# Patient Record
Sex: MALE | Race: WHITE | URBAN - METROPOLITAN AREA
[De-identification: names, ages, dates, MRNs, and addresses within clinical notes are randomized per-mention and may not be internally consistent; named-entity substitution may affect disease eponyms.]

---

## 2018-10-16 ENCOUNTER — OUTPATIENT (OUTPATIENT)
Dept: OUTPATIENT SERVICES | Facility: HOSPITAL | Age: 68
LOS: 1 days | Discharge: HOME | End: 2018-10-16

## 2018-10-16 VITALS
OXYGEN SATURATION: 97 % | DIASTOLIC BLOOD PRESSURE: 77 MMHG | TEMPERATURE: 97 F | SYSTOLIC BLOOD PRESSURE: 141 MMHG | WEIGHT: 192.9 LBS | HEIGHT: 67 IN | HEART RATE: 79 BPM | RESPIRATION RATE: 17 BRPM

## 2018-10-16 VITALS — SYSTOLIC BLOOD PRESSURE: 161 MMHG | DIASTOLIC BLOOD PRESSURE: 82 MMHG

## 2018-10-16 LAB — GLUCOSE BLDC GLUCOMTR-MCNC: 164 MG/DL — HIGH (ref 70–99)

## 2018-10-18 DIAGNOSIS — H26.8 OTHER SPECIFIED CATARACT: ICD-10-CM

## 2018-10-18 DIAGNOSIS — E11.9 TYPE 2 DIABETES MELLITUS WITHOUT COMPLICATIONS: ICD-10-CM

## 2018-10-18 PROBLEM — Z86.79 PERSONAL HISTORY OF OTHER DISEASES OF THE CIRCULATORY SYSTEM: Chronic | Status: ACTIVE | Noted: 2018-10-16

## 2018-10-23 ENCOUNTER — OUTPATIENT (OUTPATIENT)
Dept: OUTPATIENT SERVICES | Facility: HOSPITAL | Age: 68
LOS: 1 days | Discharge: HOME | End: 2018-10-23

## 2018-10-23 VITALS
DIASTOLIC BLOOD PRESSURE: 72 MMHG | OXYGEN SATURATION: 97 % | HEIGHT: 67 IN | TEMPERATURE: 99 F | SYSTOLIC BLOOD PRESSURE: 143 MMHG | HEART RATE: 77 BPM | WEIGHT: 192.02 LBS | RESPIRATION RATE: 18 BRPM

## 2018-10-23 VITALS
HEIGHT: 67 IN | HEART RATE: 77 BPM | OXYGEN SATURATION: 97 % | RESPIRATION RATE: 18 BRPM | DIASTOLIC BLOOD PRESSURE: 72 MMHG | SYSTOLIC BLOOD PRESSURE: 143 MMHG | TEMPERATURE: 99 F | WEIGHT: 192.02 LBS

## 2018-10-23 DIAGNOSIS — H26.9 UNSPECIFIED CATARACT: Chronic | ICD-10-CM

## 2018-10-23 LAB — GLUCOSE BLDC GLUCOMTR-MCNC: 148 MG/DL — HIGH (ref 70–99)

## 2018-10-23 RX ORDER — GLIMEPIRIDE 1 MG
2.5 TABLET ORAL
Qty: 0 | Refills: 0 | COMMUNITY

## 2018-10-23 RX ORDER — LISINOPRIL 2.5 MG/1
0 TABLET ORAL
Qty: 0 | Refills: 0 | COMMUNITY

## 2018-10-23 NOTE — PRE-ANESTHESIA EVALUATION ADULT - NSANTHADDINFOFT_GEN_ALL_CORE
Because pt is not NPO, procedure will be done without IV sedation but with monitored anesthesia care. Both pt and Dr. Leong agree.

## 2018-10-26 DIAGNOSIS — E78.00 PURE HYPERCHOLESTEROLEMIA, UNSPECIFIED: ICD-10-CM

## 2018-10-26 DIAGNOSIS — H26.9 UNSPECIFIED CATARACT: ICD-10-CM

## 2018-10-26 DIAGNOSIS — E11.9 TYPE 2 DIABETES MELLITUS WITHOUT COMPLICATIONS: ICD-10-CM

## 2019-02-25 NOTE — ASU PREOP CHECKLIST - ORDERS/MEDICATION ADMINISTRATION RECORD ON CHART
PHYSICAL THERAPY Daily Note    Referring Provider:  Dr. Solomon Lujan    Diagnosis:       ICD-10-CM ICD-9-CM    1. Status post total shoulder arthroplasty, left Z96.612 V43.61             Orders:  Evaluate and Treat    Date of Initial Evaluation: 10-24-18      Visit # 26    SUBJECTIVE:  Pt. Reports cont. Tension in L upper arm.  Pt. Reports only mild discomfort in L upper arm.      Current Pain:  Not reported today    OBJECTIVE:      ASSESSMENT: Pt. Tolerated PROM of L shoulder joint well.  Pt. Has mild discomfort in L upper arm with passive ext. Rotation.  Pt.  Progressed to 75% L shoulder abduction actively.  Pt. Has about 85% L shoulder flexion and external rotation.  Pt. More independent with functional diagonal pattern exercises.  Pt still has muscle tension distal L supraspinatus region.  Pt. Educated on donning heat to L upper arm but still reports not doing so and refuses moist heat at clinic.  Pt. Will have one more visit next week if no increase in pain or loss of ROM.      TREATMENT PROVIDED:  -Manual Therapy:  L shoulder PROM with stretch and end range  X 5 min., STM x 8 min. To L biceps, L shoulder joint mob x 2 min.      -Therapeutic Exercise: one on one 13 min, supervised x 12 min..: L shoulder AROM x 5 min., D1 flex x 2 min., D2 flex x 2 min., middle trap x 2 min., lower trap x 2 min., ext. Rot x 2 min., int. Rot x 2 min., scap retraction x 2min., biceps curls x 2 min, shoulder ext x 2min., shoulder hor. Add x 2min.      -Modalities: N/A    -Education on condition and posture, activity modification    PLAN:  Patient will benefit from physical therapy (2-3) x/week for (4-6) weeks including manual therapy, therapeutic exercise, functional activities, modalities, and patient education.    Thank you for this referral.    These services are reasonable and necessary for the conditions set forth above while under my care.   done

## 2021-07-26 PROBLEM — E78.00 PURE HYPERCHOLESTEROLEMIA, UNSPECIFIED: Chronic | Status: ACTIVE | Noted: 2018-10-23

## 2021-07-28 ENCOUNTER — NON-APPOINTMENT (OUTPATIENT)
Age: 71
End: 2021-07-28

## 2021-07-28 PROBLEM — Z00.00 ENCOUNTER FOR PREVENTIVE HEALTH EXAMINATION: Status: ACTIVE | Noted: 2021-07-28

## 2021-08-06 ENCOUNTER — RESULT REVIEW (OUTPATIENT)
Age: 71
End: 2021-08-06

## 2021-08-11 ENCOUNTER — APPOINTMENT (OUTPATIENT)
Dept: UROLOGY | Facility: CLINIC | Age: 71
End: 2021-08-11
Payer: MEDICARE

## 2021-08-11 VITALS — BODY MASS INDEX: 29.03 KG/M2 | WEIGHT: 185 LBS | HEIGHT: 67 IN

## 2021-08-11 DIAGNOSIS — R35.0 FREQUENCY OF MICTURITION: ICD-10-CM

## 2021-08-11 DIAGNOSIS — Z12.5 ENCOUNTER FOR SCREENING FOR MALIGNANT NEOPLASM OF PROSTATE: ICD-10-CM

## 2021-08-11 DIAGNOSIS — N52.01 ERECTILE DYSFUNCTION DUE TO ARTERIAL INSUFFICIENCY: ICD-10-CM

## 2021-08-11 DIAGNOSIS — N40.0 BENIGN PROSTATIC HYPERPLASIA WITHOUT LOWER URINARY TRACT SYMPMS: ICD-10-CM

## 2021-08-11 PROCEDURE — 99204 OFFICE O/P NEW MOD 45 MIN: CPT

## 2021-08-11 RX ORDER — SILDENAFIL 100 MG/1
100 TABLET, FILM COATED ORAL
Qty: 15 | Refills: 6 | Status: ACTIVE | COMMUNITY
Start: 2021-08-11 | End: 1900-01-01

## 2021-08-11 NOTE — HISTORY OF PRESENT ILLNESS
[FreeTextEntry1] : ТАТЬЯНА BLANCAS is a 71 year year old presenting for a General Urology Check Up. \par Patient has a past medical history of Diabetes, high cholesterol, hypertension. \par \par Patient referred after having MRI of lumbar spine for back pain which showed an enlarged prostate. \par \par Urination symptoms: Patient states that he has good urinary stream. Patient reports nocturia 2-4 x nightly. Denies dysuria and gross hematuria. Denies urinary incontinence. Currently not taking any medications to help urination. \par IPSS:26/ 35 severe urinary symptoms.\par \par Erections: Patient reports difficulty getting and maintaining erection. Never taken medication. Denies ever having a heart attack. Denies chest pain with exercise. Denies taking nitroglycerine medication. \par IIEF: 5- Severe ED\par \par Prostate Cancer Screening: Patient states he last got PSA 6 months ago. States that results were normal. Patient states he got blood work at [facility]. \par PSA June 2021- 1.63 ng/mL\par PSA 3 years ago 1.25\par PSA 4 years ago 2.00\par PSA 5 years ago 1.27\par \par Liquid Intake: 7-8 cups of coffee daily. \par Occupation:\par \par Old Records:\par Hemoglobin A1c 2021- 9.2 ng/mL \par Creatinine- 0.62\par \par \par Primary Care Doctor: Dr. Eldridge . \par

## 2021-08-11 NOTE — PHYSICAL EXAM
[Well Groomed] : well groomed [General Appearance - In No Acute Distress] : no acute distress [] : no respiratory distress [Normal Station and Gait] : the gait and station were normal for the patient's age [Skin Color & Pigmentation] : normal skin color and pigmentation [No Focal Deficits] : no focal deficits [Oriented To Time, Place, And Person] : oriented to person, place, and time

## 2021-08-11 NOTE — ASSESSMENT
[FreeTextEntry1] : ТАТЬЯНА BLANCAS is a 71 year year old presenting for a General Urology Check Up. \par Patient has a past medical history of Diabetes, high cholesterol, hypertension. \par \par Patient referred after having MRI of lumbar spine for back pain which showed an enlarged prostate. \par \par Urination symptoms: Patient states that he has good urinary stream. Patient reports nocturia 2-4 x nightly. Denies dysuria and gross hematuria. Denies urinary incontinence. Currently not taking any medications to help urination. \par IPSS:26/ 35 severe urinary symptoms.\par \par Erections: Patient reports difficulty getting and maintaining erection. Never taken medication. Denies ever having a heart attack. Denies chest pain with exercise. Denies taking nitroglycerine medication. \par IIEF: 5- Severe ED\par \par Prostate Cancer Screening: Patient states he last got PSA 6 months ago. States that results were normal. Patient states he got blood work at [facility]. \par PSA June 2021- 1.63 ng/mL\par PSA 3 years ago 1.25\par PSA 4 years ago 2.00\par PSA 5 years ago 1.27\par \par Liquid Intake: 7-8 cups of coffee daily. \par Occupation:\par \par Old Records:\par Hemoglobin A1c 2021- 9.2 ng/mL \par Creatinine- 0.62\par \par \par Primary Care Doctor: Dr. Eldridge .

## 2021-09-24 RX ORDER — TADALAFIL 20 MG/1
20 TABLET ORAL
Qty: 10 | Refills: 0 | Status: ACTIVE | COMMUNITY
Start: 2021-09-24 | End: 1900-01-01

## 2021-10-22 ENCOUNTER — APPOINTMENT (OUTPATIENT)
Dept: UROLOGY | Facility: CLINIC | Age: 71
End: 2021-10-22

## 2022-03-28 ENCOUNTER — APPOINTMENT (OUTPATIENT)
Dept: UROLOGY | Facility: CLINIC | Age: 72
End: 2022-03-28

## 2023-02-15 ENCOUNTER — APPOINTMENT (OUTPATIENT)
Dept: CARDIOLOGY | Facility: CLINIC | Age: 73
End: 2023-02-15
Payer: MEDICARE

## 2023-02-15 ENCOUNTER — RESULT CHARGE (OUTPATIENT)
Age: 73
End: 2023-02-15

## 2023-02-15 VITALS
HEIGHT: 67 IN | HEART RATE: 71 BPM | SYSTOLIC BLOOD PRESSURE: 126 MMHG | WEIGHT: 186 LBS | BODY MASS INDEX: 29.19 KG/M2 | DIASTOLIC BLOOD PRESSURE: 68 MMHG

## 2023-02-15 PROCEDURE — 99212 OFFICE O/P EST SF 10 MIN: CPT

## 2023-02-15 NOTE — HISTORY OF PRESENT ILLNESS
[FreeTextEntry1] : Mr traylor is a 72 year old male patient, with Hx of HTN, DM DL, ex heavy smoker, have been evaluated in [facility] office for coronary calcification found on CT chest routine. echo was done showed normal EF\par a CCTA was scheduled for further. evaluation and started on asa statin and BB. \par However patient make a regular follow up appointment in the office here. \par BP stable currently patient is stable\par manager at [facility] office called. the CCTA referral will be renewed and patient will be contacted when ready to schedule an appointment for the CCTA at [facility]. \par explained detail to the patient \par Will follow with patient at [facility] office. \par

## 2023-04-24 ENCOUNTER — RESULT CHARGE (OUTPATIENT)
Age: 73
End: 2023-04-24

## 2023-04-24 ENCOUNTER — APPOINTMENT (OUTPATIENT)
Dept: ORTHOPEDIC SURGERY | Facility: CLINIC | Age: 73
End: 2023-04-24
Payer: MEDICARE

## 2023-04-24 DIAGNOSIS — M85.89 OTHER SPECIFIED DISORDERS OF BONE DENSITY AND STRUCTURE, MULTIPLE SITES: ICD-10-CM

## 2023-04-24 DIAGNOSIS — M17.12 UNILATERAL PRIMARY OSTEOARTHRITIS, LEFT KNEE: ICD-10-CM

## 2023-04-24 DIAGNOSIS — M17.11 UNILATERAL PRIMARY OSTEOARTHRITIS, RIGHT KNEE: ICD-10-CM

## 2023-04-24 PROCEDURE — 73562 X-RAY EXAM OF KNEE 3: CPT | Mod: 50

## 2023-04-24 PROCEDURE — 99204 OFFICE O/P NEW MOD 45 MIN: CPT

## 2023-04-24 RX ORDER — LIDOCAINE 4 G/100G
4 PATCH TOPICAL
Qty: 30 | Refills: 5 | Status: ACTIVE | COMMUNITY
Start: 2023-04-24 | End: 1900-01-01

## 2023-04-24 RX ORDER — MELOXICAM 7.5 MG/1
7.5 TABLET ORAL
Qty: 30 | Refills: 1 | Status: ACTIVE | COMMUNITY
Start: 2023-04-24 | End: 1900-01-01

## 2023-04-24 RX ORDER — ACETAMINOPHEN 500 MG/1
500 TABLET ORAL 3 TIMES DAILY
Qty: 120 | Refills: 0 | Status: ACTIVE | COMMUNITY
Start: 2023-04-24 | End: 1900-01-01

## 2023-04-24 NOTE — DISCUSSION/SUMMARY
[de-identified] : This note was dictated using voice recognition software and it is possible that there may have been errors of dictation.  Care was taken to review these errors and to correct them but it is possible that some may have been missed.  It is rare, but possible,  that these errors may cause a change in the substance of the note.    If there are any concerns or questions, please do not hesitate to contact us at [phone] to clarify\par  I reviewed their  follow-up /  new consult form that includes their current medications, allergies list of medical problems and previous hospitalizations and surgical procedures about the problem that has brought them in.  It also lists a pain scale both at rest and activity numerical 0-10.  It was the onset of the problem and the things that make it worse and better.  It lists their treatment up to date and their imaging up to date.  If further includes their social history and review of systems\par \par \par \par Location:  \par \par Quality: dull \par \par  Severity: 5/10 \par \par  Timing: intermittent, worse at night and after activity \par \par  Context: with activity and increased use \par \par Modifying factors: physical modalities such as heating pads or ice or pain cream or orthotics have not been employed usefully.  A therapeutic trial of OTC NSAIDS such as Ibuprofren 600mg (3 OTC pills) TID or Naproxen 450mg (2 over the counter pills) BID  and Acetaminophen  1000mg  (2 extra strength 500mg tablets or 3 regular strength 325mg tablets) has not been fully executed but intermittent use of similar has given partial temporary relief \par \par Associated signs and symptoms/Pertinent negatives: They have no signs or symptoms of DVT or PE.  They have not had any chest pain or shortness of breath or calf swelling.  They have not had constitutional signs of weight loss or night sweats or easy bruisability.  They have not had any particular exposure to ticks or Lyme disease or presence of rashes\par \par OVERALL PHYSICAL EXAM\par GENERAL: Well developed well nourished in no acute distress \par \par MENTAL:Alert and oriented x3, normal affect, Pleasant and accompanied by family \par \par MUSCULOSKELETAL: Ambulating independently \par \par HEENT: NCAT, EOM intact, mucosa moist, neck supple with adequate free rom \par \par CARDIOVASCULAR/HEMATOLOGICAL: no JVD, no peripheral edema, good capillary refill,  skin warm and well perfused, no venous stasis ulcers, pulses intact, no pallor or superficial non-traumatic bruising \par \par NEUROLOGICAL: free passive rom without rigidity or cog wheel motion \par \par RESPIRATORY: no gross stridor or wheezing or increased respiratory effort or use of O2, good capil refill and color \par \par INTEGUMENTARY: skin intact without obvious suspicious lesions where examined \par \par \par Bilateral KNEE EXAM\par There range of motion is preserved.  They are slightly uncomfortable at the patellofemoral joint with compression and with resisted straight leg raising.  There is a very mild effusion and they do have joint line tenderness medial greater than lateral.  Anterior and posterior drawer are negative.  The knee is stable to varus valgus stress there neurovascular intact without signs or symptoms of DVT\par \par OSTEOPENIA\par We discussed with them the maintenance of bone health through weightbearing activities and general health measures such as smoking cessation, diabetic control and proper weight maintenance.  We did advocate that they take vitamin D 1 to 5000 units a day and calcium citrate 500 mg a day.  This can be obtained over-the-counter.  We stressed that they should take calcium citrate not calcium carbonate which is more like carbon/chalk and is not as well absorbed.  We encourage them to speak to their primary care physician as regards the issue of whether or not they should get a bone density test and possibly be on adjunct of treatment such as Prolia, Fosamax etc.\par \par TODAY IN OUR OFFICE WE HAD X-RAYS OF BOTH KNEES AP AND LATERAL AND THEY BOTH SHOW SEVERE DJD.  INTERESTINGLY REALLY ONLY THE RIGHT SIDE HURTS BUT THE LEFT SIDE IS EQUALLY ARTHRITIC.\par \par \par He really just wanted reassurance and an explanation and does not want an injection or any other treatment at this time and he will follow-up as needed.  He does one the Tylenol and meloxicam and calcium and vitamin D and we will call that in he does have a this note was dictated using voice recognition software and it is possible that there may have been errors of dictation.  Care was taken to review these errors and to correct them but it is possible that some may have been missed.  It is rare, but possible,  that these errors may cause a change in the substance of the note.    If there are any concerns or questions, please do not hesitate to contact us at [phone] to clarify\par  I reviewed their  follow-up /  new consult form that includes their current medications, allergies list of medical problems and previous hospitalizations and surgical procedures about the problem that has brought them in.  It also lists a pain scale both at rest and activity numerical 0-10.  It was the onset of the problem and the things that make it worse and better.  It lists their treatment up to date and their imaging up to date.  If further includes their social history and review of systems\par \par \par \par Location:  \par \par Quality: dull \par \par  Severity: 5/10 \par \par  Timing: intermittent, worse at night and after activity \par \par  Context: with activity and increased use \par \par Modifying factors: physical modalities such as heating pads or ice or pain cream or orthotics have not been employed usefully.  A therapeutic trial of OTC NSAIDS such as Ibuprofren 600mg (3 OTC pills) TID or Naproxen 450mg (2 over the counter pills) BID  and Acetaminophen  1000mg  (2 extra strength 500mg tablets or 3 regular strength 325mg tablets) has not been fully executed but intermittent use of similar has given partial temporary relief \par \par Associated signs and symptoms/Pertinent negatives: They have no signs or symptoms of DVT or PE.  They have not had any chest pain or shortness of breath or calf swelling.  They have not had constitutional signs of weight loss or night sweats or easy bruisability.  They have not had any particular exposure to ticks or Lyme disease or presence of rashes\par \par OVERALL PHYSICAL EXAM\par GENERAL: Well developed well nourished in no acute distress \par \par MENTAL:Alert and oriented x3, normal affect, Pleasant and accompanied by family \par \par MUSCULOSKELETAL: Ambulating independently \par \par HEENT: NCAT, EOM intact, mucosa moist, neck supple with adequate free rom \par \par CARDIOVASCULAR/HEMATOLOGICAL: no JVD, no peripheral edema, good capillary refill,  skin warm and well perfused, no venous stasis ulcers, pulses intact, no pallor or superficial non-traumatic bruising \par \par NEUROLOGICAL: free passive rom without rigidity or cog wheel motion \par \par RESPIRATORY: no gross stridor or wheezing or increased respiratory effort or use of O2, good capil refill and color \par \par INTEGUMENTARY: skin intact without obvious suspicious lesions where examined

## 2023-04-25 ENCOUNTER — OUTPATIENT (OUTPATIENT)
Dept: OUTPATIENT SERVICES | Facility: HOSPITAL | Age: 73
LOS: 1 days | End: 2023-04-25
Payer: MEDICARE

## 2023-04-25 DIAGNOSIS — H26.9 UNSPECIFIED CATARACT: Chronic | ICD-10-CM

## 2023-04-25 DIAGNOSIS — R13.10 DYSPHAGIA, UNSPECIFIED: ICD-10-CM

## 2023-04-25 PROCEDURE — 74221 X-RAY XM ESOPHAGUS 2CNTRST: CPT | Mod: 26

## 2023-04-25 PROCEDURE — 74221 X-RAY XM ESOPHAGUS 2CNTRST: CPT

## 2023-04-26 DIAGNOSIS — R13.10 DYSPHAGIA, UNSPECIFIED: ICD-10-CM

## 2023-05-01 RX ORDER — MELOXICAM 7.5 MG/1
7.5 TABLET ORAL
Qty: 30 | Refills: 1 | Status: ACTIVE | COMMUNITY
Start: 2023-05-01 | End: 1900-01-01

## 2023-05-01 RX ORDER — ACETAMINOPHEN 500 MG/1
500 TABLET ORAL 3 TIMES DAILY
Qty: 120 | Refills: 0 | Status: ACTIVE | COMMUNITY
Start: 2023-05-01 | End: 1900-01-01

## 2023-07-19 NOTE — ASU PATIENT PROFILE, ADULT - PT NEEDS ASSIST
[facility] [address]  [id number]  Nurse Visit    Subjective:       Patient seen in clinic today. Patient ambulated to exam room with own tennis shoe with lift on Right Foot and CAM boot on LLE. No c/o pain to wound bed at present. Denies fever, chills or flu-like symptoms. Dressing to Left Foot is intact with a large amount of brown, serosanguineous, malodor drainage. Left Foot wound has a thick rim of maceration around proximal and lateral aspect of wound bed. Dressing changed as ordered.      Assessment:          (Retired) Wound 04/08/22 1137 Diabetic Ulcer Left plantar;lateral Foot (Active)   04/08/22 1137    Pre-existing: Yes   Primary Wound Type: Diabetic ulc   Side: Left   Orientation: plantar;lateral   Location: Foot   Wound Number:    Ankle-Brachial Index:    Pulses:    Removal Indication and Assessment:    Wound Outcome:    (Retired) Wound Type:    (Retired) Wound Length (cm):    (Retired) Wound Width (cm):    (Retired) Depth (cm):    Wound Description (Comments):    Removal Indications:    Wound Image   07/19/23 1452   Wound WDL ex 07/19/23 1452   Dressing Appearance Intact;Moist drainage 07/19/23 1452   Drainage Amount Large 07/19/23 1452   Drainage Characteristics/Odor Serosanguineous;Brown;Malodorous 07/19/23 1452   Appearance Red;Yellow;Moist 07/19/23 1452   Tissue loss description Full thickness 07/19/23 1452   Black (%), Wound Tissue Color 0 % 07/19/23 1452   Red (%), Wound Tissue Color 40 % 07/19/23 1452   Yellow (%), Wound Tissue Color 60 % 07/19/23 1452   Periwound Area Pale white;Moist;Macerated 07/19/23 1452   Wound Edges Defined;Open 07/19/23 1452   Care Cleansed with:;Antimicrobial agent;Sterile normal saline;Wound cleanser 07/19/23 1452   Dressing Changed 07/19/23 1452   Periwound Care Moisture barrier applied;Absorptive dressing applied 07/19/23 1452   Compression Two layer compression 07/19/23 1452   Off Loading Football dressing;Off loading shoe  "07/19/23 1452   Dressing Change Due 07/21/23 07/19/23 1452           Plan:     Wound Dressing Orders     Remove old dressing   Cleanse or irrigate with: Normal Saline     Left Foot   Protect periwound with:  Gentian Violet   Primary dressing: WOUND BASE: Cadexomer Iodine Pad to wound bed   Sensicare then U-Pad around bony prominences   Secondary dressing: Drawtex cut to size of wound bed (stacked x 2), Mextra: size used: 5" x 9", and Abram foam long x 2 laid perpendicular over wound bed   Compression: Coflex - TLC XL (blue layer touching skin then cast padding)   Off-load: Football (cast padding 3 rolls) and CAM boot           Follow up in about 2 days (around 7/21/2023) for wound care.          " no